# Patient Record
Sex: FEMALE
[De-identification: names, ages, dates, MRNs, and addresses within clinical notes are randomized per-mention and may not be internally consistent; named-entity substitution may affect disease eponyms.]

---

## 2017-02-22 ENCOUNTER — HOSPITAL ENCOUNTER (OUTPATIENT)
Dept: HOSPITAL 5 - TRG | Age: 27
Discharge: HOME | End: 2017-02-22
Attending: OBSTETRICS & GYNECOLOGY
Payer: MEDICAID

## 2017-02-22 VITALS — DIASTOLIC BLOOD PRESSURE: 62 MMHG | SYSTOLIC BLOOD PRESSURE: 104 MMHG

## 2017-02-22 DIAGNOSIS — Z3A.27: ICD-10-CM

## 2017-02-22 DIAGNOSIS — O47.02: Primary | ICD-10-CM

## 2017-02-22 LAB
BILIRUB UR QL STRIP: (no result)
BLOOD UR QL VISUAL: (no result)
HCT VFR BLD CALC: 36.3 % (ref 30.3–42.9)
HGB BLD-MCNC: 12.1 GM/DL (ref 10.1–14.3)
KETONES UR STRIP-MCNC: 20 MG/DL
LEUKOCYTE ESTERASE UR QL STRIP: (no result)
MCH RBC QN AUTO: 29 PG (ref 28–32)
MCHC RBC AUTO-ENTMCNC: 33 % (ref 30–34)
MCV RBC AUTO: 87 FL (ref 79–97)
MUCOUS THREADS #/AREA URNS HPF: (no result) /HPF
NITRITE UR QL STRIP: (no result)
PH UR STRIP: 6 [PH] (ref 5–7)
PLATELET # BLD: 245 K/MM3 (ref 140–440)
PROT UR STRIP-MCNC: (no result) MG/DL
RBC # BLD AUTO: 4.2 M/MM3 (ref 3.65–5.03)
RBC #/AREA URNS HPF: 1 /HPF (ref 0–6)
UROBILINOGEN UR-MCNC: < 2 MG/DL (ref ?–2)
WBC # BLD AUTO: 14.9 K/MM3 (ref 4.5–11)
WBC #/AREA URNS HPF: 1 /HPF (ref 0–6)

## 2017-02-22 PROCEDURE — 82565 ASSAY OF CREATININE: CPT

## 2017-02-22 PROCEDURE — 59025 FETAL NON-STRESS TEST: CPT

## 2017-02-22 PROCEDURE — 81001 URINALYSIS AUTO W/SCOPE: CPT

## 2017-02-22 PROCEDURE — 83615 LACTATE (LD) (LDH) ENZYME: CPT

## 2017-02-22 PROCEDURE — 85027 COMPLETE CBC AUTOMATED: CPT

## 2017-02-22 PROCEDURE — 84550 ASSAY OF BLOOD/URIC ACID: CPT

## 2017-02-22 PROCEDURE — 84450 TRANSFERASE (AST) (SGOT): CPT

## 2017-02-22 PROCEDURE — 36415 COLL VENOUS BLD VENIPUNCTURE: CPT

## 2017-04-17 ENCOUNTER — HOSPITAL ENCOUNTER (OUTPATIENT)
Dept: HOSPITAL 5 - TRG | Age: 27
Discharge: HOME | End: 2017-04-17
Attending: OBSTETRICS & GYNECOLOGY
Payer: MEDICAID

## 2017-04-17 VITALS — DIASTOLIC BLOOD PRESSURE: 54 MMHG | SYSTOLIC BLOOD PRESSURE: 92 MMHG
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
## 2017-04-17 DIAGNOSIS — O26.893: Primary | ICD-10-CM
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
## 2017-04-17 DIAGNOSIS — Z3A.34: ICD-10-CM
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
## 2017-04-17 DIAGNOSIS — O47.03: ICD-10-CM

## 2017-04-17 PROCEDURE — 76815 OB US LIMITED FETUS(S): CPT

## 2017-04-17 PROCEDURE — 59025 FETAL NON-STRESS TEST: CPT

## 2017-04-17 PROCEDURE — 76819 FETAL BIOPHYS PROFIL W/O NST: CPT

## 2017-04-18 NOTE — ULTRASOUND REPORT
ULTRASOUND BIOPHYSICAL PROFILE:



History: fetal well being



Technique:  Transabdominal ultrasound with Doppler interrogation.



2 - Fetal breathing movements



2 - Fetal movements



2 - Fetal posture and tone



2 - Qualitative amniotic fluid volume



8 - TOTAL SCORE OF POSSIBLE 8



Fetal Heart Rate (bpm) 129

## 2017-04-18 NOTE — ULTRASOUND REPORT
ULTRASOUND OB LIMITED



History: fetal well being



Technique:  Transabdominal ultrasound with Doppler interrogation.





Gestation: Single



Fetal Position: Cephalic



Amniotic Fluid: Normal

  RUTH = 21.7 cm

Comment: There is no evidence for abruption.



Placenta: Fundal

  Placental Grade: 1



Fetal Heart Rate:

  129 BPM



Cervical length: Not measured cm (Normal > 3 cm)

## 2017-04-20 ENCOUNTER — HOSPITAL ENCOUNTER (OUTPATIENT)
Dept: HOSPITAL 5 - TRG | Age: 27
Discharge: HOME | End: 2017-04-20
Attending: OBSTETRICS & GYNECOLOGY
Payer: MEDICAID

## 2017-04-20 VITALS — DIASTOLIC BLOOD PRESSURE: 37 MMHG | SYSTOLIC BLOOD PRESSURE: 88 MMHG

## 2017-04-20 DIAGNOSIS — Z3A.36: ICD-10-CM

## 2017-04-20 DIAGNOSIS — O47.03: Primary | ICD-10-CM

## 2017-04-20 LAB
BACTERIA #/AREA URNS HPF: (no result) /HPF
BILIRUB UR QL STRIP: (no result)
BLOOD UR QL VISUAL: (no result)
KETONES UR STRIP-MCNC: (no result) MG/DL
LEUKOCYTE ESTERASE UR QL STRIP: (no result)
MUCOUS THREADS #/AREA URNS HPF: (no result) /HPF
NITRITE UR QL STRIP: (no result)
PH UR STRIP: 6 [PH] (ref 5–7)
RBC #/AREA URNS HPF: 4 /HPF (ref 0–6)
UROBILINOGEN UR-MCNC: 2 MG/DL (ref ?–2)
WBC #/AREA URNS HPF: 1 /HPF (ref 0–6)

## 2017-04-20 PROCEDURE — 81001 URINALYSIS AUTO W/SCOPE: CPT

## 2017-04-20 PROCEDURE — 59025 FETAL NON-STRESS TEST: CPT

## 2017-05-08 ENCOUNTER — HOSPITAL ENCOUNTER (OUTPATIENT)
Dept: HOSPITAL 5 - TRG | Age: 27
Discharge: HOME | End: 2017-05-08
Attending: OBSTETRICS & GYNECOLOGY
Payer: MEDICAID

## 2017-05-08 VITALS — DIASTOLIC BLOOD PRESSURE: 58 MMHG | SYSTOLIC BLOOD PRESSURE: 108 MMHG

## 2017-05-08 DIAGNOSIS — Z3A.38: ICD-10-CM

## 2017-05-08 DIAGNOSIS — O47.1: Primary | ICD-10-CM

## 2017-05-08 PROCEDURE — 59025 FETAL NON-STRESS TEST: CPT

## 2017-05-18 ENCOUNTER — HOSPITAL ENCOUNTER (OUTPATIENT)
Dept: HOSPITAL 5 - TRG | Age: 27
Discharge: HOME | End: 2017-05-18
Attending: OBSTETRICS & GYNECOLOGY
Payer: MEDICAID

## 2017-05-18 VITALS — SYSTOLIC BLOOD PRESSURE: 91 MMHG | DIASTOLIC BLOOD PRESSURE: 54 MMHG

## 2017-05-18 DIAGNOSIS — Z3A.39: ICD-10-CM

## 2017-05-18 DIAGNOSIS — O47.1: Primary | ICD-10-CM

## 2017-05-18 PROCEDURE — 59025 FETAL NON-STRESS TEST: CPT

## 2017-05-23 ENCOUNTER — HOSPITAL ENCOUNTER (OUTPATIENT)
Dept: HOSPITAL 5 - TRG | Age: 27
Discharge: HOME | End: 2017-05-23
Attending: OBSTETRICS & GYNECOLOGY
Payer: MEDICAID

## 2017-05-23 VITALS — DIASTOLIC BLOOD PRESSURE: 70 MMHG | SYSTOLIC BLOOD PRESSURE: 98 MMHG

## 2017-05-23 DIAGNOSIS — O48.0: Primary | ICD-10-CM

## 2017-05-23 DIAGNOSIS — Z3A.40: ICD-10-CM

## 2017-05-23 PROCEDURE — 59025 FETAL NON-STRESS TEST: CPT

## 2017-05-23 PROCEDURE — 76819 FETAL BIOPHYS PROFIL W/O NST: CPT

## 2017-05-23 PROCEDURE — 76815 OB US LIMITED FETUS(S): CPT

## 2017-05-24 NOTE — ULTRASOUND REPORT
ULTRASOUND BIOPHYSICAL PROFILE:



History: fetal well being



Technique:  Transabdominal ultrasound with Doppler interrogation.



2 - Fetal breathing movements



2 - Fetal movements



2 - Fetal posture and tone



2 - Qualitative amniotic fluid volume



8 - TOTAL SCORE OF POSSIBLE 8



Fetal Heart Rate (bpm) 141

## 2017-05-24 NOTE — ULTRASOUND REPORT
ULTRASOUND OB LIMITED



History: fetal well being



Technique:  Transabdominal ultrasound with Doppler interrogation.





Gestation: Single



Fetal Position: Cephalic



Amniotic Fluid: Normal

  RUTH = 17.1 cm



Fetal Heart Rate:

  141 BPM

## 2017-05-26 ENCOUNTER — HOSPITAL ENCOUNTER (INPATIENT)
Dept: HOSPITAL 5 - LD | Age: 27
LOS: 3 days | Discharge: HOME | End: 2017-05-29
Attending: OBSTETRICS & GYNECOLOGY | Admitting: OBSTETRICS & GYNECOLOGY
Payer: MEDICAID

## 2017-05-26 DIAGNOSIS — E66.01: ICD-10-CM

## 2017-05-26 DIAGNOSIS — Z3A.40: ICD-10-CM

## 2017-05-26 DIAGNOSIS — Z83.3: ICD-10-CM

## 2017-05-26 DIAGNOSIS — Z82.49: ICD-10-CM

## 2017-05-26 DIAGNOSIS — F17.200: ICD-10-CM

## 2017-05-26 LAB
HCT VFR BLD CALC: 36.7 % (ref 30.3–42.9)
HGB BLD-MCNC: 12.4 GM/DL (ref 10.1–14.3)
MCH RBC QN AUTO: 30 PG (ref 28–32)
MCHC RBC AUTO-ENTMCNC: 34 % (ref 30–34)
MCV RBC AUTO: 88 FL (ref 79–97)
PLATELET # BLD: 210 K/MM3 (ref 140–440)
RBC # BLD AUTO: 4.18 M/MM3 (ref 3.65–5.03)
WBC # BLD AUTO: 14.1 K/MM3 (ref 4.5–11)

## 2017-05-26 PROCEDURE — 86900 BLOOD TYPING SEROLOGIC ABO: CPT

## 2017-05-26 PROCEDURE — 99406 BEHAV CHNG SMOKING 3-10 MIN: CPT

## 2017-05-26 PROCEDURE — 85018 HEMOGLOBIN: CPT

## 2017-05-26 PROCEDURE — 85014 HEMATOCRIT: CPT

## 2017-05-26 PROCEDURE — 85027 COMPLETE CBC AUTOMATED: CPT

## 2017-05-26 PROCEDURE — 59200 INSERT CERVICAL DILATOR: CPT

## 2017-05-26 PROCEDURE — 36415 COLL VENOUS BLD VENIPUNCTURE: CPT

## 2017-05-26 PROCEDURE — 86850 RBC ANTIBODY SCREEN: CPT

## 2017-05-26 PROCEDURE — 86901 BLOOD TYPING SEROLOGIC RH(D): CPT

## 2017-05-26 RX ADMIN — SODIUM CHLORIDE, SODIUM LACTATE, POTASSIUM CHLORIDE, AND CALCIUM CHLORIDE SCH MLS/HR: .6; .31; .03; .02 INJECTION, SOLUTION INTRAVENOUS at 23:34

## 2017-05-26 NOTE — HISTORY AND PHYSICAL REPORT
History of Present Illness


Date of examination: 17


Date of admission: 


17 20:24





Chief complaint: 





Here for induction of labor


History of present illness: 





27 y/o , now 40.3 weeks presents for induction of labor. Hx of prenatal 

care which began at 24 wks gestation. Was on 17P for 20 week loss. HGSIL during 

this pregnancy. Had lapse in care from 32 weeks until 40 weeks. GBS unknown





Past History


Past Surgical History: tonsillectomy, other (cerclage )


GYN History: chlamydia, trichomonas


Family/Genetic History: diabetes, hypertension


Social history: smoking





- Obstetrical History


Expected Date of Delivery: 17


Actual Gestation: 40 Week(s) 3 Day(s) 


: 4


Para: 2


Spontaneous Abortions: 1


Number of Living Children: 2





Medications and Allergies


 Allergies











Allergy/AdvReac Type Severity Reaction Status Date / Time


 


No Known Allergies Allergy   Verified 17 12:22











 Home Medications











 Medication  Instructions  Recorded  Confirmed  Last Taken  Type


 


Pnv95/Ferrous Fumarate/FA 1 each PO QDAY #30 tablet 02/01/15 05/18/17 05/17/15 

22:00 Rx





[Prenatal Vitamins]    1 tab 


 


Prenatal Vit W-Ca,Fe,FA(<1 mg) 1 each PO QDAY #30 tablet 16 

Unknown Rx





[Prenatal Vitamins]     


 


Ranitidine HCl [Acid Control] 150 mg PO BID 02/11/17 05/18/17 02/10/17 09:00 

History





    1 











Active Meds: 


Active Medications





Dinoprostone (Cervidil)  10 mg VG ONCE ONE


   Stop: 17 21:10


Ephedrine Sulfate (Ephedrine Sulfate)  10 mg IV Q2M PRN


   PRN Reason: Hypotension


   Stop: 17 21:14


Fentanyl (Sublimaze)  100 mcg IV Q2H PRN


   PRN Reason: Labor Pain


Ampicillin Sodium (Polycillin/Ns 1 Gm/50 Ml)  1 gm in 50 mls @ 100 mls/hr IV 

Q4HR VANDA


   PRN Reason: Protocol


Ampicillin Sodium (Polycillin/Ns 2 Gm/100 Ml)  2 gm in 100 mls @ 100 mls/hr IV 

ONCE ONE


   PRN Reason: Protocol


   Stop: 17 22:08


Lactated Ringer's (Lactated Ringers)  1,000 mls @ 125 mls/hr IV AS DIRECT VANDA


Oxytocin/Sodium Chloride (Pitocin/Ns 20 Unit/1000ml Drip)  20 units in 1,000 

mls @ 125 mls/hr IV AS DIRECT VANDA


Lidocaine (Xylocaine 2%)  20 ml INFILTRATI ONCE ONE


   Stop: 17 21:10


Mineral Oil (Mineral Oil)  30 ml PO QHS PRN


   PRN Reason: Constipation


Terbutaline Sulfate (Brethine)  0.25 mg SUB-Q ONCE PRN


   PRN Reason: Hyperstimulation/Hypertonicity


   Stop: 17 21:10


Terbutaline Sulfate (Brethine)  0.25 mg IVP ONCE PRN


   PRN Reason: Hyperstimulation/Hypertonicity


   Stop: 17 21:10











Review of Systems


All systems: negative





- Physical Exam


Breasts: Positive: deferred


Cardiovascular: Regular rate


Lungs: Positive: Clear to auscultation


Abdomen: Positive: soft


Genitourinary (Female): Positive: normal external genitalia


Vulva: both: normal


Vagina: Positive: normal moisture


Uterus: Positive: enlarged


Adnexa: both: normal


Anus/Rectum: Positive: normal perianal skin


Deep Tendon Reflex Grade: Normal +2





- Obstetrical


FHR: category 1


Uterine Contraction Monitor Mode: External


Cervical Dilatation: 1


Cervical Effacement Percentage: 70


Fetal station: -1


Uterine Contraction Pattern: Absent





Results


All other labs normal.








Assessment and Plan





A: IUP @ 40.3 weeks


    Morbid obesity


P: Cervadil ripening

## 2017-05-27 PROCEDURE — 3E0S3CZ: ICD-10-PCS | Performed by: ADVANCED PRACTICE MIDWIFE

## 2017-05-27 PROCEDURE — 3E0P7GC INTRODUCTION OF OTHER THERAPEUTIC SUBSTANCE INTO FEMALE REPRODUCTIVE, VIA NATURAL OR ARTIFICIAL OPENING: ICD-10-PCS | Performed by: ADVANCED PRACTICE MIDWIFE

## 2017-05-27 PROCEDURE — 00HU33Z INSERTION OF INFUSION DEVICE INTO SPINAL CANAL, PERCUTANEOUS APPROACH: ICD-10-PCS | Performed by: ADVANCED PRACTICE MIDWIFE

## 2017-05-27 RX ADMIN — IBUPROFEN SCH MG: 600 TABLET, FILM COATED ORAL at 21:28

## 2017-05-27 RX ADMIN — FENTANYL CITRATE PRN MCG: 50 INJECTION, SOLUTION INTRAMUSCULAR; INTRAVENOUS at 09:55

## 2017-05-27 RX ADMIN — AMPICILLIN SODIUM SCH MLS/HR: 1 INJECTION, POWDER, FOR SOLUTION INTRAMUSCULAR; INTRAVENOUS at 13:06

## 2017-05-27 RX ADMIN — SODIUM CHLORIDE, SODIUM LACTATE, POTASSIUM CHLORIDE, AND CALCIUM CHLORIDE SCH MLS/HR: .6; .31; .03; .02 INJECTION, SOLUTION INTRAVENOUS at 14:45

## 2017-05-27 RX ADMIN — FENTANYL CITRATE PRN MCG: 50 INJECTION, SOLUTION INTRAMUSCULAR; INTRAVENOUS at 11:48

## 2017-05-27 RX ADMIN — AMPICILLIN SODIUM SCH MLS/HR: 1 INJECTION, POWDER, FOR SOLUTION INTRAMUSCULAR; INTRAVENOUS at 03:49

## 2017-05-27 RX ADMIN — AMPICILLIN SODIUM SCH MLS/HR: 1 INJECTION, POWDER, FOR SOLUTION INTRAMUSCULAR; INTRAVENOUS at 17:40

## 2017-05-27 RX ADMIN — SODIUM CHLORIDE, SODIUM LACTATE, POTASSIUM CHLORIDE, AND CALCIUM CHLORIDE SCH MLS/HR: .6; .31; .03; .02 INJECTION, SOLUTION INTRAVENOUS at 11:45

## 2017-05-27 NOTE — PROCEDURE NOTE
OB Delivery Note





- Delivery


Date of Delivery: 17


Surgeon: CORONA ALEXANDRA


Estimated blood loss: 100cc





- Vaginal


Delivery presentation: vertex


Delivery position: OA


Delivery induction: cervidil


Delivery augmentation: rupture of membranes, pitocin


Delivery monitor: external FHT, external uterine, internal FHT, internal uterine


Route of delivery: 


Delivery placenta: spontaneous


Delivery cord: nuchal cord


Episiotomy: none


Delivery laceration: none


Anesthesia: intravenous, epidural


Delivery comments: 





 of a viable male 7#10 oz @ 1850 on 2017 over intact perineum. Nuchal 

cord loose x1. Placenta delivered 3VCI. FF@ u-2, lochia small.Mother and baby 

doing well.





- Infant


  ** A


Apgar at 1 minute: 8


Apgar at 5 minutes: 9


Infant Gender: Male (7# 10 oz)

## 2017-05-27 NOTE — EVENT NOTE
Date: 17





S: I'm nervous


O: VE /-2, AROM, clear fluid, IUPC and FSE placed, Pit at 12 mu, Uc's every 

2-4 min. Epidural in. Cat I tracing


A: Induction of labor at 40+ weeks


P: Expect

## 2017-05-27 NOTE — ANESTHESIA CONSULTATION
Anesthesia Consult and Med Hx


Date of service: 05/27/17





- Airway


Anesthetic Teeth Evaluation: Good


ROM Head & Neck: Adequate


Mental/Hyoid Distance: Adequate


Mallampati Class: Class II


Intubation Access Assessment: Probably Good





- Pulmonary Exam


CTA: Yes





- Cardiac Exam


Cardiac Exam: RRR





- Pre-Operative Health Status


ASA Pre-Surgery Classification: ASA2


Proposed Anesthetic Plan: Epidural





- Pulmonary


Hx Smoking: Yes (1/2 ppd x9 years)


Hx Asthma: No


Hx Respiratory Symptoms: No


SOB: No


COPD: No


Hx Pneumonia: No


Hx Sleep Apnea: No





- Cardiovascular System


Hx Hypertension: No


Hx Coronary Artery Disease: No


Hx Heart Attack/AMI: No


Hx Angina: No


Hx Percutaneous Transluminal Coronary Angioplasty (PTCA): No


Hx Cardia Arrhythmia: No


Hx Pacemaker: No


Hx Internal Defibrillator: No


Hx Valvular Heart Disease: No


Hx Heart Murmur: No


Hx Peripheral Vascular Disease: No





- Central Nervous System


Hx Neuromuscular Disorder: No


Hx Seizures: No


CVA: No


Hx Back Pain: No


Hx Psychiatric Problems: No





- Gastrointestinal


Hx Ulcer: No


Hx Gastroesophageal Reflux Disease: No





- Endocrine


Hx Renal Disease: No


Hx End Stage Renal Disease: No


Hx Liver Disease: No


Hx Insulin Dependent Diabetes: No


Hx Non-Insulin Dependent Diabetes: No


Hx Thyroid Disease: No


Hx Hypothyroidism: No


Hx Hyperthyroidism: No





- Hematic


Hx Anemia: No


Hx Sickle Cell Disease: No





- Other Systems


Hx Alcohol Use: No


Hx Substance Use: No


Hx Cancer: No


Hx Obesity: Yes

## 2017-05-28 LAB
HCT VFR BLD CALC: 34.8 % (ref 30.3–42.9)
HGB BLD-MCNC: 11.5 GM/DL (ref 10.1–14.3)

## 2017-05-28 RX ADMIN — IBUPROFEN SCH MG: 600 TABLET, FILM COATED ORAL at 13:58

## 2017-05-28 RX ADMIN — IBUPROFEN SCH MG: 600 TABLET, FILM COATED ORAL at 20:00

## 2017-05-28 RX ADMIN — HYDROCODONE BITARTRATE AND ACETAMINOPHEN PRN EACH: 5; 325 TABLET ORAL at 09:06

## 2017-05-28 RX ADMIN — HYDROCODONE BITARTRATE AND ACETAMINOPHEN PRN EACH: 5; 325 TABLET ORAL at 23:06

## 2017-05-28 RX ADMIN — HYDROCODONE BITARTRATE AND ACETAMINOPHEN PRN EACH: 5; 325 TABLET ORAL at 17:17

## 2017-05-28 RX ADMIN — IBUPROFEN SCH: 600 TABLET, FILM COATED ORAL at 09:06

## 2017-05-28 NOTE — PROGRESS NOTE
Assessment and Plan





- Patient Problems


(1)  (normal spontaneous vaginal delivery)


Onset Date: 17   Current Visit: No   Status: Resolved   


Plan to address problem: 


A:  S/P  - PPD #1  Doing well





 P:  May go home tomorrow.








Subjective





- Subjective


Date of service: 17


Principal diagnosis: s/p  - PPD #1


Interval history: 





Pt is feeling well without complaints.  Bleeding improved.


Patient reports: appetite normal, voiding normally, pain well controlled, flatus

, ambulating normally


: doing well, bottle feeding





Objective





- Vital Signs


Latest vital signs: 


 Vital Signs











  Temp Pulse Resp BP


 


 17 08:17  98.3 F  76  16  114/56


 


 17 04:30  97.7 F  80  20  118/73


 


 17 23:10  98.6 F  88  20  127/70


 


 17 21:28    18 


 


 17 20:51  98.9 F  79  20  100/56


 


 17 17:16  98.8 F   20 


 


 17 14:30  97.9 F   22 


 


 17 12:18    20 


 


 17 11:58  96.7 F L   22 


 


 17 11:48    20 








 Intake and Output











 17





 22:59 06:59 14:59


 


Intake Total 1078 480 


 


Output Total 1050 800 


 


Balance 28 -320 


 


Intake:   


 


  IV 86  


 


    PITOCin/NS 30 UNIT/500ML 86  





    30 units In 500 ml @ 4   





    mls/hr IV TITR VANDA Rx#:   





    [id number]   


 


  Oral  480 


 


  Other 992  


 


Output:   


 


  Urine 1050 800 


 


    Indwelling Catheter 1050  


 


    Void  800 


 


Other:   


 


  Intake, Other Source Saline Solution  


 


  Total, Intake Amount 992 240 


 


  Total, Output Amount 400 600 


 


  Estimated Blood Loss 200  














- Exam


Breasts: Present: deferred


Cardiovascular: Present: Regular rate


Lungs: Present: Clear to auscultation


Abdomen: Present: normal appearance, soft


Uterus: Present: normal, firm, fundal height below umbilicus


Extremities: Present: normal





- Labs


Labs: 





 Laboratory Tests











  17





  21:36 21:36 06:07


 


WBC  14.1 H  


 


RBC  4.18  


 


Hgb  12.4   11.5


 


Hct  36.7   34.8


 


MCV  88  


 


MCH  30  


 


MCHC  34  


 


RDW  14.7  


 


Plt Count  210  


 


Blood Type   AB POSITIVE 


 


Antibody Screen   TNR 


 


QUITA Antibody Screen   Negative

## 2017-05-28 NOTE — DISCHARGE SUMMARY
Providers





- Providers


Date of Admission: 


17 20:24





Date of discharge: 17


Attending physician: 


OSEI REID MD





Primary care physician: 


OSEI REID MD








Hospitalization


Reason for admission: induction of labor, IUP at term


Delivery: 


Episiotomy: none


Laceration: none


Other postpartum procedures: none


Postpartum complications: none


Discharge diagnosis: IUP at term delivered


 baby: male


Hospital course: 





Unremarkable.


Condition at discharge: Good


Disposition: DISCHARGED TO HOME OR SELFCARE





- Discharge Diagnoses


(1)  (normal spontaneous vaginal delivery)


Status: Resolved   





Plan





- Discharge Medications


Prescriptions: 


Ibuprofen [Motrin 600 MG tab] 600 mg PO Q6H #30 tablet


Prenatal Vit W-Ca,Fe,FA(<1 mg) [Prenatal Vitamins] 1 each PO QDAY #30 tablet





- Provider Discharge Summary


Activity: routine, no sex for 6 weeks, no heavy lifting 4 weeks, no strenuous 

exercise


Diet: routine


Instructions: routine


Additional instructions: 


[]  Smoking cessation referral if applicable(refer to patient education folder 

for contact #)


[]  Refer to [facility] Booklet








Call your doctor immediately for:


* Fever > 100.5


* Heavy vaginal bleeding ( >1 pad per hour)


* Severe persistent headache


* Shortness of breath


* Reddened, hot, painful area to leg or breast


* Drainage or odor from incision.





* Keep incision clean and dry at all times and follow doctor's instructions 

regarding bathing/showering











- Follow up plan


Follow up: 


OSEI CESPEDES MD [Primary Care Provider] - 6 Weeks

## 2017-05-29 VITALS — SYSTOLIC BLOOD PRESSURE: 121 MMHG | DIASTOLIC BLOOD PRESSURE: 72 MMHG

## 2017-05-29 RX ADMIN — IBUPROFEN SCH MG: 600 TABLET, FILM COATED ORAL at 02:11

## 2019-01-18 ENCOUNTER — HOSPITAL ENCOUNTER (INPATIENT)
Dept: HOSPITAL 5 - LD | Age: 29
LOS: 2 days | Discharge: HOME | End: 2019-01-20
Attending: OBSTETRICS & GYNECOLOGY | Admitting: OBSTETRICS & GYNECOLOGY
Payer: MEDICAID

## 2019-01-18 DIAGNOSIS — Z79.899: ICD-10-CM

## 2019-01-18 DIAGNOSIS — Z3A.39: ICD-10-CM

## 2019-01-18 DIAGNOSIS — O41.03X0: Primary | ICD-10-CM

## 2019-01-18 DIAGNOSIS — Z90.89: ICD-10-CM

## 2019-01-18 DIAGNOSIS — F17.200: ICD-10-CM

## 2019-01-18 DIAGNOSIS — Z82.49: ICD-10-CM

## 2019-01-18 LAB
BASOPHILS # (AUTO): 0.1 K/MM3 (ref 0–0.1)
BASOPHILS NFR BLD AUTO: 1 % (ref 0–1.8)
EOSINOPHIL # BLD AUTO: 0.1 K/MM3 (ref 0–0.4)
EOSINOPHIL NFR BLD AUTO: 0.9 % (ref 0–4.3)
HCT VFR BLD CALC: 39.3 % (ref 30.3–42.9)
HGB BLD-MCNC: 13.2 GM/DL (ref 10.1–14.3)
LYMPHOCYTES # BLD AUTO: 1.7 K/MM3 (ref 1.2–5.4)
LYMPHOCYTES NFR BLD AUTO: 13.6 % (ref 13.4–35)
MCHC RBC AUTO-ENTMCNC: 34 % (ref 30–34)
MCV RBC AUTO: 87 FL (ref 79–97)
MONOCYTES # (AUTO): 1 K/MM3 (ref 0–0.8)
MONOCYTES % (AUTO): 7.5 % (ref 0–7.3)
PLATELET # BLD: 259 K/MM3 (ref 140–440)
RBC # BLD AUTO: 4.5 M/MM3 (ref 3.65–5.03)

## 2019-01-18 PROCEDURE — 3E033VJ INTRODUCTION OF OTHER HORMONE INTO PERIPHERAL VEIN, PERCUTANEOUS APPROACH: ICD-10-PCS | Performed by: OBSTETRICS & GYNECOLOGY

## 2019-01-18 PROCEDURE — 85025 COMPLETE CBC W/AUTO DIFF WBC: CPT

## 2019-01-18 PROCEDURE — 86592 SYPHILIS TEST NON-TREP QUAL: CPT

## 2019-01-18 PROCEDURE — 10H07YZ INSERTION OF OTHER DEVICE INTO PRODUCTS OF CONCEPTION, VIA NATURAL OR ARTIFICIAL OPENING: ICD-10-PCS | Performed by: OBSTETRICS & GYNECOLOGY

## 2019-01-18 PROCEDURE — 10907ZC DRAINAGE OF AMNIOTIC FLUID, THERAPEUTIC FROM PRODUCTS OF CONCEPTION, VIA NATURAL OR ARTIFICIAL OPENING: ICD-10-PCS | Performed by: OBSTETRICS & GYNECOLOGY

## 2019-01-18 PROCEDURE — 36415 COLL VENOUS BLD VENIPUNCTURE: CPT

## 2019-01-18 PROCEDURE — 85018 HEMOGLOBIN: CPT

## 2019-01-18 PROCEDURE — 88307 TISSUE EXAM BY PATHOLOGIST: CPT

## 2019-01-18 PROCEDURE — 86850 RBC ANTIBODY SCREEN: CPT

## 2019-01-18 PROCEDURE — 90686 IIV4 VACC NO PRSV 0.5 ML IM: CPT

## 2019-01-18 PROCEDURE — 86900 BLOOD TYPING SEROLOGIC ABO: CPT

## 2019-01-18 PROCEDURE — 86901 BLOOD TYPING SEROLOGIC RH(D): CPT

## 2019-01-18 PROCEDURE — 85014 HEMATOCRIT: CPT

## 2019-01-18 RX ADMIN — AMPICILLIN SODIUM SCH MLS/HR: 1 INJECTION, POWDER, FOR SOLUTION INTRAMUSCULAR; INTRAVENOUS at 20:31

## 2019-01-18 NOTE — ANESTHESIA DAY OF SURGERY
Anesthesia Day of Surgery





- Day of Surgery


Patient Examined: Yes


Patient H&P Reviewed: Yes


Patient is NPO: Yes


Beta Blockers: No


Cardiac Clearance: No


Pulmonary Clearance: No


Jamison's Test: N/A

## 2019-01-18 NOTE — ANESTHESIA CONSULTATION
Anesthesia Consult and Med Hx





- Airway


Anesthetic Teeth Evaluation: Good


ROM Head & Neck: Adequate


Mental/Hyoid Distance: Adequate


Mallampati Class: Class II


Intubation Access Assessment: Good





- Pulmonary Exam


CTA: Yes





- Cardiac Exam


Cardiac Exam: RRR





- Pre-Operative Health Status


ASA Pre-Surgery Classification: ASA2


Proposed Anesthetic Plan: Epidural





- Pulmonary


Hx Smoking: Yes (1/2 ppd x9 years)


Hx Asthma: No


Hx Respiratory Symptoms: No


SOB: No


COPD: No


Hx Pneumonia: No


Hx Sleep Apnea: No





- Cardiovascular System


Hx Hypertension: No


Hx Coronary Artery Disease: No


Hx Heart Attack/AMI: No


Hx Angina: No


Hx Percutaneous Transluminal Coronary Angioplasty (PTCA): No


Hx Cardia Arrhythmia: No


Hx Pacemaker: No


Hx Internal Defibrillator: No


Hx Valvular Heart Disease: No


Hx Heart Murmur: No


Hx Peripheral Vascular Disease: No





- Central Nervous System


Hx Neuromuscular Disorder: No


Hx Seizures: No


CVA: No


Hx Back Pain: No


Hx Psychiatric Problems: No





- Gastrointestinal


Hx Ulcer: No


Hx Gastroesophageal Reflux Disease: No





- Endocrine


Hx Renal Disease: No


Hx End Stage Renal Disease: No


Hx Liver Disease: No


Hx Insulin Dependent Diabetes: No


Hx Non-Insulin Dependent Diabetes: No


Hx Thyroid Disease: No


Hx Hypothyroidism: No


Hx Hyperthyroidism: No





- Hematic


Hx Anemia: Yes


Hx Sickle Cell Disease: No





- Other Systems


Hx Alcohol Use: No


Hx Substance Use: No


Hx Cancer: No


Hx Obesity: Yes

## 2019-01-18 NOTE — HISTORY AND PHYSICAL REPORT
History of Present Illness


Date of examination: 19


Date of admission: 


19 14:14





Chief complaint: 


IUP 39w 2d sent from [facility] for IOL, oligohydramnios





History of present illness: 


Menstrual History 


Regularity: regular


Menses every: 28 days


Duration: 4


LMP: 2018


LMP reliability: month known


LMP character: normal


Pregnancy test type: urine test


Planned pregnancy? no





EDC Calculations 


LMP: 2018





EDC Confirmation:  2019


Gestational Age:  17 6/7 weeks





Past Pregnancy History 


   :      5


   Term Births:      3


   Living Children:   3


   Para:         3


   Spont. Ab:      1





Pregnancy # 1


   Delivery date:     


   Weeks Gestation:   20


   Anesthesia type:     epidural


   Comments:      incompetent cx





Pregnancy # 2


   Delivery date:     


   Weeks Gestation:   FT


   Delivery type:     


   Delivery location:     [facility]


   Infant Sex:      Male


   Birth weight:      7-4


   Comments:      cerclage placed


17 P given due to PTL





Pregnancy # 3


   Delivery date:     


   Weeks Gestation:   FT


   Delivery type:     


   Anesthesia type:     none


   Delivery location:     [facility]


   Infant Sex:      Female


   Birth weight:      7-7


   Comments:      no cerclage d/t cx staying long- late presentation


17P 








Pregnancy # 4


   Delivery date:     2017


   Weeks Gestation:   FT


   Delivery type:     


   Anesthesia type:     epidural


   Delivery location:     [facility]


   Infant Sex:      Male


   Birth weight:      7-9


   Comments:      late presentation no cerclage


17P given


IOL post dates








Past Medical History:


   cervical incompentencd


   Vertigo





Past Surgical History:


   Tonsillectomy


   ear tubes- toddler





Past Medical History 


Surgery (Non-gyn): Tonsillectomy


ear tubes- toddler


Abnormal PAP: negative


DANA Exposure: negative


Infertility: negative


Uterine Anomaly: negative


Uterine Surgery (not C/S): negative


Other Gynecologic Problems: negative





Family Hx: DM


HTN


Heart DZ





Social Hx: single


+tobacco(4/day); no etoh, no drugs











Infection History 


Hx of STD: none


Partner hx. of genital herpes: no


Rash, Viral, or Febrile illness since last LMP? no


Varicella/Chicken Pox Status: Unknown





Genetic History 


 Congenital Heart Defect:


    Mom: no


Canavan Disease:


    Mom: no


Thalassemia


    Mom: no


Neural Tube Defect


    Mom: no


Down's Syndrome


    Mom: no


Venkat-Sachs


    Mom: no


Sickle Cell Disease/Trait


    Mom: no


Hemophilia


    Mom: no


Muscular Dystrophy


    Mom: no


Cystic Fibrosis


    Mom: no


Rachel Chorea


    Mom: no


Mental Retardation


    Mom: no


Fragile X


    Mom: no


Other Genetic/Chromosomal Disorder


    Mom: no


Child w/other birth defect


    Mom: no





Enviromental Exposures 


Xray Exposure: no


Medication, drug, or alcohol use since LMP: no


Chemical/Other Exposure: no


Exposure to Cat Liter: no


Hx of Parvovirus (Fifth Disease): no


Occupational Exposure to Children: none


Active Medications (reviewed today):


PRENATAL PLUS 27-1 MG ORAL TABLET (PRENATAL VIT-FE FUMARATE-FA) 1 po





Current Allergies (reviewed today):


No known allergies











Past History


Past Medical History: other (see hpi)


Past Surgical History: other (see hpi)


GYN History: other (see hpi)


Family/Genetic History: other (see hpi)


Social history: other (see hpi)





- Obstetrical History


: 5





Medications and Allergies


                                    Allergies











Allergy/AdvReac Type Severity Reaction Status Date / Time


 


No Known Allergies Allergy   Verified 19 14:36











                                Home Medications











 Medication  Instructions  Recorded  Confirmed  Last Taken  Type


 


RX: Prenatal Vit Calc,Iron,Folic 1 each PO QDAY #30 tablet 17 

Unknown Rx





[Prenatal Vitamins]     











Active Meds: 


Active Medications





Ephedrine Sulfate (Ephedrine Sulfate)  10 mg IV Q2M PRN


   PRN Reason: Hypotension


Fentanyl (Sublimaze)  100 mcg IV Q2H PRN


   PRN Reason: Labor Pain


Lactated Ringer's (Lactated Ringers)  1,000 mls @ 125 mls/hr IV AS DIRECT VANDA


Ampicillin Sodium (Ampicillin/Ns 1 Gm/50 Ml)  1 gm in 50 mls @ 100 mls/hr IV 

Q4HR VANDA; Protocol


Lactated Ringer's (Lactated Ringers)  1,000 mls @ 125 mls/hr IV AS DIRECT VANDA


   Last Admin: 19 15:46 Dose:  125 mls/hr


   Documented by: 


Oxytocin/Sodium Chloride (Pitocin/Ns 20 Unit/1000ml Drip)  20 units in 1,000 mls

@ 125 mls/hr IV AS DIRECT VANDA


Oxytocin/Sodium Chloride (Pitocin/Ns 30 Unit/500ml)  30 units in 500 mls @ 4 

mls/hr IV TITR VANDA; Protocol


   Last Admin: 19 18:30 Dose:  4 ml/hr, 4 mls/hr


   Documented by: 


Mineral Oil (Mineral Oil)  30 ml PO QHS PRN


   PRN Reason: Constipation


Ondansetron HCl (Zofran)  4 mg IV Q8H PRN


   PRN Reason: Nausea And Vomiting


Terbutaline Sulfate (Brethine)  0.25 mg SUB-Q ONCE PRN


   PRN Reason: Hyperstimulation/Hypertonicity











Review of Systems


All systems: negative


Genitourinary: vaginal discharge ("mucous")





- Vital Signs


Vital signs: 


                                   Vital Signs











Temp Pulse Resp BP


 


 98.4 F   111 H  20   144/77 


 


 19 14:48  19 14:48  19 14:48  19 14:48








                                        











Temp Pulse Resp BP Pulse Ox


 


 98.4 F   87   20   130/82   98 


 


 19 14:48  19 20:23  19 14:48  19 19:29  19 20:23














- Physical Exam


Breasts: Positive: normal


Cardiovascular: Regular rate, Normal S1, Normal S2


Lungs: Positive: Clear to auscultation


Abdomen: Positive: normal appearance, soft, normal bowel sounds.  Negative: 

distention, tenderness


Genitourinary (Female): Positive: normal external genitalia, normal perenium


Vulva: both: normal


Vagina: Positive: normal moisture.  Negative: discharge


Cervix: Negative: lesion, discharge


Uterus: Positive: normal size, normal contour


Adnexa: both: normal


Anus/Rectum: Positive: normal perianal skin, heme negative.  Negative: rectal 

mass, hemorrhoids


Extremities: Positive: normal


Deep Tendon Reflex Grade: Normal +2





- Obstetrical


FHR: auscultation normal, category 1


Uterine Contraction Monitor Mode: External


Cervical Dilatation: 3.5


Cervical Effacement Percentage: 60


Fetal station: -2


Uterine Contraction Frequency (min): 2-4


Uterine Contraction Duration: 50-80


Uterine Contraction Pattern: Regular


Uterine Tone Measurement Phase: Contraction


Uterine Contraction Intensity: Moderate





Results


Result Diagrams: 


                                 19 15:05





                              Abnormal lab results











  19 Range/Units





  15:05 


 


WBC  12.7 H  (4.5-11.0)  K/mm3


 


Mono % (Auto)  7.5 H  (0.0-7.3)  %


 


Mono #  1.0 H  (0.0-0.8)  K/mm3


 


Seg Neutrophils %  77.0 H  (40.0-70.0)  %


 


Seg Neutrophils #  9.8 H  (1.8-7.7)  K/mm3








All other labs normal.








Assessment and Plan


28 y.o. IUP at 39w2d here for IOL from [facility] for oligohydramnios. Patient denies 

feeling contractions or pain on admission. Category 1 tracing. Abx ordered for 

unknown GBS. AROM performed and IUPC placed d/t inability to monitor 

contractions via TOCO or palpation. Pitocin infusing as ordered. Patient 

planning natural delivery at this time. Plan for . 








- Patient Problems


(1) 39 weeks gestation of pregnancy


Current Visit: Yes   Status: Acute   





(2) BMI 50.0-59.9, adult


Current Visit: Yes   Status: Acute   





(3) Insufficient prenatal care in third trimester


Current Visit: Yes   Status: Acute   





(4) Oligohydramnios


Current Visit: Yes   Status: Acute

## 2019-01-19 LAB
HCT VFR BLD CALC: 36.1 % (ref 30.3–42.9)
HGB BLD-MCNC: 11.5 GM/DL (ref 10.1–14.3)

## 2019-01-19 PROCEDURE — 00HU33Z INSERTION OF INFUSION DEVICE INTO SPINAL CANAL, PERCUTANEOUS APPROACH: ICD-10-PCS | Performed by: ANESTHESIOLOGY

## 2019-01-19 PROCEDURE — 3E0R3BZ INTRODUCTION OF ANESTHETIC AGENT INTO SPINAL CANAL, PERCUTANEOUS APPROACH: ICD-10-PCS | Performed by: ANESTHESIOLOGY

## 2019-01-19 RX ADMIN — IBUPROFEN SCH MG: 800 TABLET, FILM COATED ORAL at 23:12

## 2019-01-19 RX ADMIN — AMPICILLIN SODIUM SCH MLS/HR: 1 INJECTION, POWDER, FOR SOLUTION INTRAMUSCULAR; INTRAVENOUS at 00:37

## 2019-01-19 RX ADMIN — IBUPROFEN SCH MG: 800 TABLET, FILM COATED ORAL at 15:47

## 2019-01-19 RX ADMIN — IBUPROFEN SCH MG: 800 TABLET, FILM COATED ORAL at 05:09

## 2019-01-19 NOTE — EVENT NOTE
Date: 01/19/19


Patient calling out complaining of "epidural not working anymore". Reports 

feeling strong pain in lower abdomen. SVE 5/80/-1. Thick meconium still noted. 

Anesthesia called to redose catheter. Continue current POC. Will reassess when 

comfortable.

## 2019-01-19 NOTE — PROCEDURE NOTE
OB Delivery Note





- Delivery


Date of Delivery: 19


Assistant: GIOVANNI MARCH (Dr. Calloway present at delivery)


Estimated blood loss: 300cc





- Vaginal


Delivery position: OA (BEATRIS)


Intrapartum events: meconium (thick), other(please specify) (oligohydramnios)


Delivery induction: oxytocin


Delivery monitor: internal FHT, internal uterine


Route of delivery: 


Delivery placenta: spontaneous


Episiotomy: none


Delivery laceration: none


Anesthesia: epidural


Delivery comments: 





 of viable male infant over intact perineum. Infant placed on mothers chest.

Spontaneous, vigorous cry, infant stable, awaiting NICU staff ok to leave room. 

Cord clamped x2 upon cessation of pulsation and cut by FOC. No cord blood 

needed. Placenta delivered complete and intact. Manual sweep yields no clots, 

fundus firm. No lacerations. . Infant apgars 8/9, weight 7#8. Mother and 

infant remain in LRD, stable condition. 





- Infant


  ** A


Apgar at 1 minute: 8


Apgar at 5 minutes: 9


Infant Gender: Male (7#8)

## 2019-01-20 VITALS — DIASTOLIC BLOOD PRESSURE: 83 MMHG | SYSTOLIC BLOOD PRESSURE: 133 MMHG

## 2019-01-20 RX ADMIN — IBUPROFEN SCH MG: 800 TABLET, FILM COATED ORAL at 06:34

## 2019-01-20 NOTE — DISCHARGE SUMMARY
Providers





- Providers


Date of Admission: 


19 14:14





Date of discharge: 19 (patient desires discharge today)


Attending physician: 


SIMON FATIMA





Primary care physician: 


SIMON FATIMA








Hospitalization


Reason for admission: IOL for oligohydramnios


Condition: Good


Pertinent studies: 





post delivery h&h 11.5/36.1


Procedures: 








Hospital course: 





uncomplicated labor&delivery and postpartum course





Disposition: DC-01 TO HOME OR SELFCARE





- Discharge Diagnoses


(1) 39 weeks gestation of pregnancy


Status: Acute   





(2) BMI 50.0-59.9, adult


Status: Acute   





(3) Insufficient prenatal care in third trimester


Status: Acute   





(4) Oligohydramnios


Status: Acute   





Core Measure Documentation





- Palliative Care


Palliative Care/ Comfort Measures: Not Applicable





- Core Measures


Any of the following diagnoses?: none





Exam





- Constitutional


Vitals: 


                                        











Temp Pulse Resp BP Pulse Ox


 


 97.7 F   84   18   126/62   98 


 


 19 08:43  19 08:43  19 08:43  19 08:43  19 08:43











General appearance: Present: no acute distress, well-nourished





- EENT


Eyes: Present: PERRL


ENT: hearing intact, clear oral mucosa





- Neck


Neck: Present: supple, normal ROM





- Respiratory


Respiratory effort: normal


Respiratory: bilateral: CTA





- Cardiovascular


Heart Sounds: Present: S1 & S2.  Absent: rub, click





- Extremities


Extremities: pulses symmetrical, No edema


Peripheral Pulses: within normal limits





- Abdominal


General gastrointestinal: Present: soft, non-tender, non-distended, normal bowel

sounds


Female genitourinary: Present: normal





- Integumentary


Integumentary: Present: clear, warm, dry





- Musculoskeletal


Musculoskeletal: gait normal, strength equal bilaterally





- Psychiatric


Psychiatric: appropriate mood/affect, intact judgment & insight





- Neurologic


Neurologic: CNII-XII intact, moves all extremities





- Additional findings


Additional findings: 





fundus firm, ML, U/2, scant bleeding. pain well controlled with ibuprofen. 

bottle feeding. breasts are feeling well, instructed to wear snug fitted bra 





Plan


Activity: no restrictions


Diet: regular


Follow up with: 


SIMON FATIMA MD [Primary Care Provider] - 7 Days (Congratulations! Please 

call [phone] to schedule your baby boy's circumcision for 1 week. Bring 

EMLA cream with you to appointment and await further instructions for use. 

Please also schedule your postpartum appointment with [facility] for 4 weeks from 

now. Call with any questions or concerns. )


Prescriptions: 


Ibuprofen [Motrin] 600 mg PO Q8H PRN #30 tablet


 PRN Reason: Pain


Lidocain2.5%/Prilocai2.5% [Emla] 5 gm TP ONCE #1 tube